# Patient Record
Sex: FEMALE | Race: OTHER | HISPANIC OR LATINO | Employment: STUDENT | URBAN - METROPOLITAN AREA
[De-identification: names, ages, dates, MRNs, and addresses within clinical notes are randomized per-mention and may not be internally consistent; named-entity substitution may affect disease eponyms.]

---

## 2020-07-12 ENCOUNTER — HOSPITAL ENCOUNTER (EMERGENCY)
Facility: HOSPITAL | Age: 25
Discharge: HOME | End: 2020-07-12
Attending: EMERGENCY MEDICINE
Payer: MEDICAID

## 2020-07-12 VITALS
HEIGHT: 62 IN | TEMPERATURE: 100 F | BODY MASS INDEX: 34.96 KG/M2 | HEART RATE: 104 BPM | DIASTOLIC BLOOD PRESSURE: 77 MMHG | OXYGEN SATURATION: 100 % | WEIGHT: 190 LBS | SYSTOLIC BLOOD PRESSURE: 148 MMHG

## 2020-07-12 DIAGNOSIS — Z20.822 SUSPECTED COVID-19 VIRUS INFECTION: Primary | ICD-10-CM

## 2020-07-12 PROCEDURE — U0003 INFECTIOUS AGENT DETECTION BY NUCLEIC ACID (DNA OR RNA); SEVERE ACUTE RESPIRATORY SYNDROME CORONAVIRUS 2 (SARS-COV-2) (CORONAVIRUS DISEASE [COVID-19]), AMPLIFIED PROBE TECHNIQUE, MAKING USE OF HIGH THROUGHPUT TECHNOLOGIES AS DESCRIBED BY CMS-2020-01-R: HCPCS

## 2020-07-12 PROCEDURE — 99284 EMERGENCY DEPT VISIT MOD MDM: CPT

## 2020-07-12 RX ORDER — GUAIFENESIN 100 MG/5ML
100-200 SOLUTION ORAL EVERY 4 HOURS PRN
Qty: 60 ML | Refills: 0 | Status: SHIPPED | OUTPATIENT
Start: 2020-07-12 | End: 2020-07-22

## 2020-07-12 RX ORDER — ALBUTEROL SULFATE 90 UG/1
1-2 AEROSOL, METERED RESPIRATORY (INHALATION) EVERY 6 HOURS PRN
Qty: 6.7 G | Refills: 0 | OUTPATIENT
Start: 2020-07-12 | End: 2020-07-16

## 2020-07-12 RX ORDER — ACETAMINOPHEN 500 MG/1
1000 CAPSULE, LIQUID FILLED ORAL EVERY 8 HOURS PRN
Qty: 30 CAPSULE | Refills: 0 | Status: SHIPPED | OUTPATIENT
Start: 2020-07-12 | End: 2022-03-28 | Stop reason: CLARIF

## 2020-07-12 RX ORDER — BENZONATATE 100 MG/1
100 CAPSULE ORAL 3 TIMES DAILY PRN
Qty: 30 CAPSULE | Refills: 0 | Status: SHIPPED | OUTPATIENT
Start: 2020-07-12 | End: 2020-07-22

## 2020-07-12 NOTE — DISCHARGE INSTRUCTIONS
Thank you for allowing me to care for you today.  I hope our treatment plan will make you feel better in the next few days.  In order for me to take better care of my future patients and improve our Emergency Department, I would appreciate if you can provide us with feedback.  In the next few days, you may receive a survey in the mail.  If you do, it would mean a great deal to me if you would please take the time to complete it.    Thank you and I hope you feel better.  Lexy Wilson NP

## 2020-07-12 NOTE — ED TRIAGE NOTES
Pt presents to ED for covid test. Pt states she was exposed to covid and has now lost her sense of taste and complaining of body aches

## 2020-07-12 NOTE — Clinical Note
"Zina "Rehan Bennett was seen and treated in our emergency department on 7/12/2020.     COVID-19 is present in our communities across the state. There is limited testing for COVID at this time, so not all patients can be tested. In this situation, your employee meets the following criteria:    Zina Bennett has met the criteria for COVID-19 testing based upon symptoms, travel, and/or potential exposure. The test has been completed and is pending results at this time. During this time the employee is not able to work and should be quarantined per the Centers for Disease Control timelines.     If you have any questions or concerns, or if I can be of further assistance, please do not hesitate to contact me.    Sincerely,             Lexy Wilson NP"

## 2020-07-13 LAB — SARS-COV-2 RNA RESP QL NAA+PROBE: NOT DETECTED

## 2020-07-13 NOTE — ED PROVIDER NOTES
Encounter Date: 7/12/2020       History     Chief Complaint   Patient presents with    COVID-19 Concerns     pt presents to ED today c/o body aches and loss of taste onset today. pt reports her boyfriend was seen here yesterday and was tested for COVID-19, however did not receive his test results      The patient is a 24-year-old female with a past medical history of kidney stones who presents to the ED complaining of body aches, cough, and loss of taste that began yesterday.  Her boyfriend has also been complaining of similar symptoms.  No known fevers.  Tolerating oral intake.  No change in elimination patterns.  No treatment attempted prior to arrival.    The history is provided by the patient.     Review of patient's allergies indicates:  No Known Allergies  Past Medical History:   Diagnosis Date    Kidney calculus      Past Surgical History:   Procedure Laterality Date    APPENDECTOMY       No family history on file.  Social History     Tobacco Use    Smoking status: Never Smoker   Substance Use Topics    Alcohol use: No    Drug use: No     Review of Systems   Constitutional: Negative for fever.   HENT: Negative for sore throat.         Loss of taste   Respiratory: Positive for cough. Negative for shortness of breath.    Cardiovascular: Negative for chest pain.   Gastrointestinal: Negative for nausea.   Genitourinary: Negative for dysuria.   Musculoskeletal: Positive for myalgias (Generalized). Negative for back pain.   Skin: Negative for rash.   Neurological: Negative for dizziness and weakness.   Hematological: Does not bruise/bleed easily.       Physical Exam     Initial Vitals   BP Pulse Resp Temp SpO2   07/12/20 1425 07/12/20 1425 -- 07/12/20 1426 07/12/20 1425   (!) 148/77 104  99.5 °F (37.5 °C) 100 %      MAP       --                Physical Exam    Nursing note and vitals reviewed.  Constitutional: She appears well-developed and well-nourished.  Non-toxic appearance. No distress.   Limited  physical exam due to use of telemedicine technology in light of COVID-19 pandemic  The patient is alert, oriented, and nontoxic appearing.  No apparent distress.  Respirations are even and unlabored.  Speaking in complete sentences.   HENT:   Head: Normocephalic and atraumatic.   Right Ear: Hearing normal.   Left Ear: Hearing and abnormal external ear normal.   Nose: Nose abnormal.   Mouth/Throat: Mucous membranes are normal.   Eyes: Conjunctivae and EOM are normal.   Neck: Full passive range of motion without pain. Neck supple.   Cardiovascular: Normal rate.   Pulmonary/Chest: Effort normal. No respiratory distress.   Musculoskeletal: Normal range of motion.   Neurological: She is alert and oriented to person, place, and time. She has normal strength. Gait normal. GCS eye subscore is 4. GCS verbal subscore is 5. GCS motor subscore is 6.   Skin: Skin is warm, dry and intact. Capillary refill takes less than 2 seconds. No rash noted.   Psychiatric: She has a normal mood and affect. Her speech is normal and behavior is normal. Judgment and thought content normal. Cognition and memory are normal.         ED Course   Procedures  Labs Reviewed   SARS-COV-2 (COVID-19) QUALITATIVE PCR          Imaging Results    None          Medical Decision Making:   History:   Old Medical Records: I decided to obtain old medical records.  Clinical Tests:   Lab Tests: Ordered  ED Management:  This is an emergent evaluation of a patient who presents to the emergency department for further evaluation of potential COVID symptoms.  Patient requests COVID test.  Limited physical exam conducted via telemedicine technology is not significantly concerning.  Patient's vital signs do not suggest sepsis.  No hypoxia or evidence of impending respiratory failure.  Patient's respirations are even and unlabored.  Patient reports tolerating oral intake.  Low suspicion for significant dehydration.  I believe that they are stable for discharge with  outpatient follow-up as warranted.      COVID test is pending.      Patient will be discharged with medications for symptomatic relief.  Patient was instructed on strategies for infectious disease prevention.  All questions answered.  Strict return precautions have been discussed.                                     Clinical Impression:       ICD-10-CM ICD-9-CM   1. Suspected Covid-19 Virus Infection  R68.89              ED Disposition Condition    Discharge Stable        ED Prescriptions     Medication Sig Dispense Start Date End Date Auth. Provider    albuterol (PROVENTIL/VENTOLIN HFA) 90 mcg/actuation inhaler Inhale 1-2 puffs into the lungs every 6 (six) hours as needed for Wheezing or Shortness of Breath. Rescue 6.7 g 7/12/2020 7/12/2021 Lexy Wilson NP    guaifenesin 100 mg/5 ml (ROBITUSSIN) 100 mg/5 mL syrup Take 5-10 mLs (100-200 mg total) by mouth every 4 (four) hours as needed for Cough. 60 mL 7/12/2020 7/22/2020 Lexy Wilson NP    benzonatate (TESSALON) 100 MG capsule Take 1 capsule (100 mg total) by mouth 3 (three) times daily as needed. 30 capsule 7/12/2020 7/22/2020 Lexy Wilson NP    acetaminophen (TYLENOL) 500 mg Cap Take 2 capsules (1,000 mg total) by mouth every 8 (eight) hours as needed (pain/fever). 30 capsule 7/12/2020  Lexy Wilson NP        Follow-up Information     Follow up With Specialties Details Why Contact Info Additional Information    [facility] Schedule an appointment as soon as possible for a visit in 3 days  [address], [address]  [facility] [address] [phone] At this time [facility] will only use these entries [facility], [facility], and Emergency Department due to COVID-19 precautions.     [facility]  If symptoms worsen [address] [address] [phone]                                      Lexy Wilson NP  07/13/20  0031

## 2020-07-16 ENCOUNTER — NURSE TRIAGE (OUTPATIENT)
Dept: ADMINISTRATIVE | Facility: CLINIC | Age: 25
End: 2020-07-16

## 2020-07-16 ENCOUNTER — HOSPITAL ENCOUNTER (EMERGENCY)
Facility: HOSPITAL | Age: 25
Discharge: HOME | End: 2020-07-16
Attending: EMERGENCY MEDICINE
Payer: MEDICAID

## 2020-07-16 VITALS
TEMPERATURE: 99 F | HEART RATE: 87 BPM | RESPIRATION RATE: 18 BRPM | DIASTOLIC BLOOD PRESSURE: 70 MMHG | SYSTOLIC BLOOD PRESSURE: 118 MMHG | BODY MASS INDEX: 40.24 KG/M2 | OXYGEN SATURATION: 98 % | WEIGHT: 220 LBS

## 2020-07-16 DIAGNOSIS — M79.10 MYALGIA: ICD-10-CM

## 2020-07-16 DIAGNOSIS — Z20.822 CLOSE EXPOSURE TO COVID-19 VIRUS: Primary | ICD-10-CM

## 2020-07-16 DIAGNOSIS — R06.02 SHORTNESS OF BREATH: ICD-10-CM

## 2020-07-16 LAB
B-HCG UR QL: NEGATIVE
CTP QC/QA: YES

## 2020-07-16 PROCEDURE — U0003 INFECTIOUS AGENT DETECTION BY NUCLEIC ACID (DNA OR RNA); SEVERE ACUTE RESPIRATORY SYNDROME CORONAVIRUS 2 (SARS-COV-2) (CORONAVIRUS DISEASE [COVID-19]), AMPLIFIED PROBE TECHNIQUE, MAKING USE OF HIGH THROUGHPUT TECHNOLOGIES AS DESCRIBED BY CMS-2020-01-R: HCPCS

## 2020-07-16 PROCEDURE — 99284 EMERGENCY DEPT VISIT MOD MDM: CPT | Mod: 25

## 2020-07-16 PROCEDURE — 81025 URINE PREGNANCY TEST: CPT | Performed by: PHYSICIAN ASSISTANT

## 2020-07-16 PROCEDURE — 93010 EKG 12-LEAD: ICD-10-PCS | Mod: ,,, | Performed by: INTERNAL MEDICINE

## 2020-07-16 PROCEDURE — 25000003 PHARM REV CODE 250: Performed by: PHYSICIAN ASSISTANT

## 2020-07-16 PROCEDURE — 93005 ELECTROCARDIOGRAM TRACING: CPT

## 2020-07-16 PROCEDURE — 93010 ELECTROCARDIOGRAM REPORT: CPT | Mod: ,,, | Performed by: INTERNAL MEDICINE

## 2020-07-16 RX ORDER — ALBUTEROL SULFATE 90 UG/1
1-2 AEROSOL, METERED RESPIRATORY (INHALATION) EVERY 6 HOURS PRN
Qty: 8 G | Refills: 0 | Status: SHIPPED | OUTPATIENT
Start: 2020-07-16 | End: 2021-07-16

## 2020-07-16 RX ORDER — IBUPROFEN 600 MG/1
600 TABLET ORAL
Status: COMPLETED | OUTPATIENT
Start: 2020-07-16 | End: 2020-07-16

## 2020-07-16 RX ADMIN — IBUPROFEN 600 MG: 200 TABLET, FILM COATED ORAL at 03:07

## 2020-07-16 NOTE — EKG INTERPRETATIONS - EMERGENCY DEPT.
Time of study: 07/16/2020 14:21    Independent interpretation by ED physician.    Normal sinus rhythm. Ventricular rate 73 bpm.  Normal axis.  Normal QRS and QT intervals.  No ST segment elevation or depression.  Normal T-wave morphology. Overall impression:  Normal EKG.

## 2020-07-16 NOTE — ED PROVIDER NOTES
Encounter Date: 7/16/2020       History     Chief Complaint   Patient presents with    Shortness of Breath     Pt c/o SOB and body aches since Sunday. States  had positive COVID-19 test recently     Zina Bennett, a 24 y.o. female  has a past medical history of Kidney calculus.     She presents to the ED evaluation of SOB and body aches since Sunday.  Patient states that her  recently tested positive for coronavirus.  She was tested on Monday but her test came back negative.  States that she has taken Tylenol and ibuprofen but that has not helped with her symptoms.  Last dose taken was yesterday.  She denies any nausea, vomiting, chest pain.      The history is provided by the patient.     Review of patient's allergies indicates:  No Known Allergies  Past Medical History:   Diagnosis Date    Kidney calculus      Past Surgical History:   Procedure Laterality Date    APPENDECTOMY       No family history on file.  Social History     Tobacco Use    Smoking status: Never Smoker   Substance Use Topics    Alcohol use: No    Drug use: No     Review of Systems   Constitutional: Positive for fatigue and fever (subjective).   HENT: Negative for congestion and sore throat.         Normal smell and taste   Respiratory: Positive for shortness of breath. Negative for cough.    Cardiovascular: Negative for chest pain.   Gastrointestinal: Negative for nausea and vomiting.   Musculoskeletal: Positive for myalgias.   Skin: Negative for rash.   Allergic/Immunologic: Negative for immunocompromised state.   Neurological: Negative for weakness.   Psychiatric/Behavioral: Negative for agitation.       Physical Exam     Initial Vitals [07/16/20 1338]   BP Pulse Resp Temp SpO2   110/68 88 17 98.9 °F (37.2 °C) 100 %      MAP       --         Physical Exam    Nursing note and vitals reviewed.  Constitutional: She appears well-developed and well-nourished. She is not diaphoretic. No distress.   HENT:   Head:  Normocephalic and atraumatic.   Right Ear: External ear normal.   Left Ear: External ear normal.   Nose: Nose normal.   Eyes: Conjunctivae and EOM are normal.   Neck: Normal range of motion.   Cardiovascular: Normal rate and regular rhythm.   Pulmonary/Chest: No respiratory distress.   Normal WOB, speaking in full sentences with NAD   Musculoskeletal: Normal range of motion.   Neurological: She is alert and oriented to person, place, and time.   Skin: No rash noted.   Psychiatric: She has a normal mood and affect. Thought content normal.         ED Course   Procedures  Labs Reviewed   SARS-COV-2 (COVID-19) QUALITATIVE PCR   POCT URINE PREGNANCY          Imaging Results          X-Ray Chest AP Portable (Final result)  Result time 07/16/20 15:43:13    Final result by Graeme Appiah MD (07/16/20 15:43:13)                 Impression:      As above.      Electronically signed by: Graeme Appiah MD  Date:    07/16/2020  Time:    15:43             Narrative:    EXAMINATION:  XR CHEST AP PORTABLE    CLINICAL HISTORY:  Shortness of breath    TECHNIQUE:  Single frontal view of the chest was performed.    COMPARISON:  None    FINDINGS:  No consolidation, pleural effusion or pneumothorax.  Cardiomediastinal silhouette is unremarkable.                                 Medical Decision Making:   Initial Assessment:   SOB, myalgias   ED Management:  Virtual Onsite Care Note:  This emergency department encounter was performed via [employer], a HIPAA-compliant virtual exam application, in concert with a tele-presenter in the room. A face to face evaluation was available to the patient in the case it was deemed necessary by me or the Emergency Department staff.     COVID pending. Normal CXR.  Vital signs do not indicate sepsis, hypoxia nor respiratory distress, and in my professional opinion the patient is well enough for discharge home. The patient was provided with discharge instructions on self-care and how to quarantine at home.  I reinforced this advice and the dangers to family and public with failure to comply. We will proceed with symptomatic treatment. The patient was also given a return to work note, if applicable. Return precautions discussed with the patient. The patient expressed understanding to my instructions.                      ED Course as of Jul 16 2130   Thu Jul 16, 2020   1610 X-Ray Chest AP Portable [AM]      ED Course User Index  [AM] Keren Gamboa PA-C                Clinical Impression:       ICD-10-CM ICD-9-CM   1. Close Exposure to Covid-19 Virus  Z20.828    2. Shortness of breath  R06.02 786.05   3. Myalgia  M79.10 729.1                                Keren Gamboa PA-C  07/16/20 2130

## 2020-07-16 NOTE — TELEPHONE ENCOUNTER
Spoke with patient she states that her  tested positive for covid-19 on Monday. Reports that her test results came back negative today.   She states that she is struggling to take a breath at this time.  Advised patient to call 911 to seek immediate medical attention.  Patient verbalized understanding.  States she will call 911 then her family.     Reason for Disposition   SEVERE difficulty breathing (e.g., struggling for each breath, speaks in single words)    Protocols used: CORONAVIRUS (COVID-19) DIAGNOSED OR [id number]

## 2020-07-16 NOTE — ED NOTES
APPEARANCE: Alert, oriented and in no acute distress.  CARDIAC: Normal rate and rhythm, no murmur heard.   PERIPHERAL VASCULAR: peripheral pulses present. Normal cap refill. No edema. Warm to touch.    RESPIRATORY:Normal rate and effort, breath sounds clear bilaterally throughout chest. Respirations are equal and unlabored no obvious signs of distress.  GASTRO: soft, bowel sounds normal, no tenderness, no abdominal distention.  MUSC: Limited ROM due to weakness. No bony tenderness or soft tissue tenderness. No obvious deformity.  SKIN: Skin is warm and dry, normal skin turgor, mucous membranes moist.  NEURO: 5/5 strength major flexors/extensors bilaterally. Sensory intact to light touch bilaterally. Yuan coma scale: eyes open spontaneously-4, oriented & converses-5, obeys commands-6. No neurological abnormalities.   MENTAL STATUS: awake, alert and aware of environment.  EYE: PERRL, both eyes: pupils brisk and reactive to light. Normal size.  ENT: EARS: no obvious drainage. NOSE: no active bleeding.   Pt complains of body aches and headache with cough

## 2020-07-16 NOTE — Clinical Note
"Zina "Rehan Bennett was seen and treated in our emergency department on 7/16/2020.     COVID-19 is present in our communities across the state. There is limited testing for COVID at this time, so not all patients can be tested. In this situation, your employee meets the following criteria:    Zina Bennett has met the criteria for COVID-19 testing based upon symptoms, travel, and/or potential exposure. The test has been completed and is pending results at this time. During this time the employee is not able to work and should be quarantined per the Centers for Disease Control timelines.     If you have any questions or concerns, or if I can be of further assistance, please do not hesitate to contact me.    Sincerely,             Keren Gamboa PA-C"

## 2020-07-17 LAB — SARS-COV-2 RNA RESP QL NAA+PROBE: DETECTED

## 2020-07-20 ENCOUNTER — HOSPITAL ENCOUNTER (EMERGENCY)
Facility: HOSPITAL | Age: 25
Discharge: HOME | End: 2020-07-20
Attending: EMERGENCY MEDICINE
Payer: MEDICAID

## 2020-07-20 ENCOUNTER — TELEPHONE (OUTPATIENT)
Dept: PODIATRY | Facility: CLINIC | Age: 25
End: 2020-07-20

## 2020-07-20 ENCOUNTER — NURSE TRIAGE (OUTPATIENT)
Dept: ADMINISTRATIVE | Facility: CLINIC | Age: 25
End: 2020-07-20

## 2020-07-20 VITALS
TEMPERATURE: 99 F | BODY MASS INDEX: 38.98 KG/M2 | RESPIRATION RATE: 18 BRPM | SYSTOLIC BLOOD PRESSURE: 117 MMHG | HEART RATE: 82 BPM | DIASTOLIC BLOOD PRESSURE: 74 MMHG | OXYGEN SATURATION: 97 % | HEIGHT: 63 IN | WEIGHT: 220 LBS

## 2020-07-20 DIAGNOSIS — M79.674 PAIN OF TOE OF RIGHT FOOT: Primary | ICD-10-CM

## 2020-07-20 PROCEDURE — 99282 EMERGENCY DEPT VISIT SF MDM: CPT

## 2020-07-20 RX ORDER — NAPROXEN 500 MG/1
500 TABLET ORAL 2 TIMES DAILY WITH MEALS
Qty: 14 TABLET | Refills: 0 | Status: SHIPPED | OUTPATIENT
Start: 2020-07-20 | End: 2020-07-20 | Stop reason: SDUPTHER

## 2020-07-20 RX ORDER — NAPROXEN 500 MG/1
500 TABLET ORAL 2 TIMES DAILY WITH MEALS
Qty: 14 TABLET | Refills: 0 | Status: SHIPPED | OUTPATIENT
Start: 2020-07-20

## 2020-07-20 NOTE — ED PROVIDER NOTES
Encounter Date: 7/20/2020       History     Chief Complaint   Patient presents with    Toe Pain     C/o pain and swelling to right 1st toe x2 days. Denies injury. Tested positive for covid-19 on Wednesday     Zina Bennett, a 24 y.o. female  has a past medical history of Kidney calculus.     She presents to the ED evaluation of pain and swelling to right great toe that started 2 days ago.  States that she tested positive for COVID on Wednesday.  Nursing line contacted her to discuss symptoms and instructed her to come to the ED.  Pain is worse on medial side of toe.  Denies any injury or drainage from site.  Treatments tried include administration of tylenol and ibuprofen with no improvement.  No history of gout.       The history is provided by the patient.     Review of patient's allergies indicates:   Allergen Reactions    Penicillins      Past Medical History:   Diagnosis Date    Kidney calculus      Past Surgical History:   Procedure Laterality Date    APPENDECTOMY       No family history on file.  Social History     Tobacco Use    Smoking status: Never Smoker   Substance Use Topics    Alcohol use: No    Drug use: No     Review of Systems   Constitutional: Negative for fever.   Gastrointestinal: Negative for nausea and vomiting.   Musculoskeletal: Positive for arthralgias.   Skin: Negative for color change and rash.   Allergic/Immunologic: Negative for immunocompromised state.   Neurological: Negative for weakness and numbness.   Psychiatric/Behavioral: Negative for agitation.       Physical Exam     Initial Vitals [07/20/20 0738]   BP Pulse Resp Temp SpO2   117/74 82 18 99 °F (37.2 °C) 97 %      MAP       --         Physical Exam    Nursing note and vitals reviewed.  Constitutional: She appears well-developed and well-nourished.   HENT:   Head: Normocephalic and atraumatic.   Right Ear: External ear normal.   Left Ear: External ear normal.   Nose: Nose normal.   Eyes: Conjunctivae and EOM are  normal.   Neck: Normal range of motion.   Cardiovascular: Normal rate and regular rhythm.   Pulmonary/Chest: No respiratory distress.   Musculoskeletal: Normal range of motion.        Feet:       Comments: TTP to medial aspect of right great toe.  No induration or skin changes.  No drainage from site.  Gel Toenail Polish is in place therefore cannot fully examine nailbed   Neurological: She is alert and oriented to person, place, and time.   Skin: Skin is warm and dry. Capillary refill takes less than 2 seconds. No rash noted. No erythema.   Psychiatric: She has a normal mood and affect. Thought content normal.         ED Course   Procedures  Labs Reviewed - No data to display       Imaging Results    None          Medical Decision Making:   Initial Assessment:   Right great toe pain   Differential Diagnosis:   Fracture, gout, ingrown toe nail, covid toes   ED Management:  Pt presents to ED for evaluation of atraumatic right great toe pain.  Skin is clean dry and intact.  No evidence abscess development no rash noted.  Tenderness along medial nailbed, concerning for possible developing ingrown toenail.  Patient was given round of anti-inflammatories.  Given information on symptomatic control and reasons for return.  Patient verbalized understanding agreement with plan.                                 Clinical Impression:       ICD-10-CM ICD-9-CM   1. Pain of toe of right foot  M79.674 729.5             ED Disposition Condition    Discharge Stable        ED Prescriptions     Medication Sig Dispense Start Date End Date Auth. Provider    naproxen (NAPROSYN) 500 MG tablet  (Status: Discontinued) Take 1 tablet (500 mg total) by mouth 2 (two) times daily with meals. 14 tablet 7/20/2020 7/20/2020 Keren Gamboa PA-C    naproxen (NAPROSYN) 500 MG tablet Take 1 tablet (500 mg total) by mouth 2 (two) times daily with meals. 14 tablet 7/20/2020  Keren Gamboa PA-C        Follow-up Information     Follow up With  Specialties Details Why Contact Info    Kash Briseno, DPSB Podiatry, Wound Care In 3 days  [address]  [address]  [facility] [address] [phone]

## 2020-07-20 NOTE — TELEPHONE ENCOUNTER
----- Message from Emily Perla sent at 7/20/2020  9:00 AM CDT -----  Contact: [phone]  Pt is requesting a callback from the office in regards to scheduling a Hospital follow-up visit with the doctor.    Please call and advise

## 2020-07-20 NOTE — TELEPHONE ENCOUNTER
Reason for Disposition   Foot is cool or blue in comparison to other foot    Protocols used: ST TOE PAIN-A-AH    Severe pain in great toe, no injury. She has covid 19. The right foot feels colder than the left and it is very difficult to walk on the right foot. Explained that covivd 19 has been causing blood clots in some patients and she needs to get her foot evaluated in the ED. Advised her to get a ride but she insists on driving herself.

## 2020-07-20 NOTE — ED NOTES
C/o pain and swelling to right 1st toe x2 days. Denies injury. Tested positive for covid-19 on Wednesday.    APPEARANCE: Alert, oriented and in no acute distress.  HEENT: Speaks without hoarseness.  CARDIAC: Normal rate and rhythm.    PERIPHERAL VASCULAR: peripheral pulses present. Normal cap refill. No edema. Warm to touch.    RESPIRATORY:Normal rate and effort. Respirations are equal and unlabored no obvious signs of distress.  GASTRO: soft, nondistended, nontender. Denies nausea, vomiting, or diarrhea.  : voids spontaneously and without difficulty.   MUSC: Full ROM. No obvious deformity. Ambulatory with a steady gait  SKIN: Skin is warm and dry, without discoloration. Mucous membranes moist. Mild redness next to right 1st toenail  NEURO: Pt is awake, alert, aware of environment. No neurologic deficits noted.

## 2020-07-22 ENCOUNTER — NURSE TRIAGE (OUTPATIENT)
Dept: ADMINISTRATIVE | Facility: CLINIC | Age: 25
End: 2020-07-22

## 2020-07-22 PROCEDURE — 99284 EMERGENCY DEPT VISIT MOD MDM: CPT

## 2020-07-22 RX ORDER — CLOTRIMAZOLE 1 %
CREAM (GRAM) TOPICAL
Qty: 15 G | Refills: 0 | Status: SHIPPED | OUTPATIENT
Start: 2020-07-22 | End: 2020-07-23 | Stop reason: SDUPTHER

## 2020-07-23 ENCOUNTER — HOSPITAL ENCOUNTER (EMERGENCY)
Facility: HOSPITAL | Age: 25
Discharge: HOME | End: 2020-07-23
Attending: EMERGENCY MEDICINE
Payer: MEDICAID

## 2020-07-23 VITALS
BODY MASS INDEX: 38.98 KG/M2 | WEIGHT: 220 LBS | TEMPERATURE: 98 F | DIASTOLIC BLOOD PRESSURE: 68 MMHG | OXYGEN SATURATION: 100 % | SYSTOLIC BLOOD PRESSURE: 132 MMHG | RESPIRATION RATE: 18 BRPM | HEART RATE: 67 BPM | HEIGHT: 63 IN

## 2020-07-23 DIAGNOSIS — B35.3 TINEA PEDIS OF BOTH FEET: ICD-10-CM

## 2020-07-23 DIAGNOSIS — M79.674 GREAT TOE PAIN, RIGHT: Primary | ICD-10-CM

## 2020-07-23 DIAGNOSIS — M79.604 RIGHT LEG PAIN: ICD-10-CM

## 2020-07-23 RX ORDER — CLOTRIMAZOLE 1 %
CREAM (GRAM) TOPICAL
Qty: 15 G | Refills: 0 | Status: SHIPPED | OUTPATIENT
Start: 2020-07-23 | End: 2022-03-28 | Stop reason: CLARIF

## 2020-07-23 NOTE — ED NOTES
Pt. Given post op shoe. Tolerating well. Pt. C/o pain to the right side of calf. Dr. Steiner aware. New order received for U/[facility].

## 2020-07-23 NOTE — TELEPHONE ENCOUNTER
Reason for Disposition   Purple or black skin on foot or toe    Additional Information   Negative: Followed a foot injury   Negative: Diabetes   Negative: Ankle pain is main symptom   Negative: Thigh or calf pain is main symptom   Negative: Entire foot is cool or blue in comparison to other foot    Protocols used: FOOT PAIN-A-AH

## 2020-07-23 NOTE — TELEPHONE ENCOUNTER
"Pt c/o unrelieved/ worsening pain to R foot, +yellow discharge & reports toe "turning now purple".   "

## 2020-07-23 NOTE — DISCHARGE INSTRUCTIONS
Elevate, ice, rest, post op shoe as needed, tylenol/naproxen as needed, follow up with primary care doctor or podiatrist

## 2020-07-23 NOTE — ED NOTES
"Review of patient's allergies indicates:   Allergen Reactions    Penicillins         Patient has verified the spelling of their name and  on armband.   APPEARANCE: Patient is alert, calm, oriented x 4, and does not appear distressed.  SKIN: Skin is normal for race, warm, and dry. Normal skin turgor and mucous membranes moist. Pt great big toe on the right side has some redness noted. Pt denies any trauma. Pt reports it was oozing "white stuff" earlier today.   CARDIAC: Normal rate and rhythm, no murmur heard.   RESPIRATORY:Normal rate and effort. Breath sounds clear bilaterally throughout chest. Respirations are equal and unlabored.    MUSCLE: Full ROM. No bony tenderness or soft tissue tenderness. No obvious deformity.  PERIPHERAL VASCULAR: peripheral pulses present. Normal cap refill. No edema. Warm to touch. Pt complains of tightness in her right calf. Pt denies any numbness but reports a tingling sensation down the right lower extremity   NEURO: 5/5 strength major flexors/extensors bilaterally. Sensory intact to light touch bilaterally. Yuan coma scale: eyes open spontaneously-4, oriented & converses-5, obeys commands-6. No neurological abnormalities.   MENTAL STATUS: awake, alert and aware of environment.    "

## 2020-07-23 NOTE — ED NOTES
Pt presented to the ed with c/o right great toe pain. Pt denies any trauma to the area. Pt reports worsening pain when ambulating. Pt reports taking naproxen and tylenlol without relief. Pt vital signs stable at this time. Pt given a call bell and instructed to call for assistance.

## 2021-04-16 ENCOUNTER — PATIENT MESSAGE (OUTPATIENT)
Dept: RESEARCH | Facility: HOSPITAL | Age: 26
End: 2021-04-16

## 2022-02-16 ENCOUNTER — PATIENT MESSAGE (OUTPATIENT)
Dept: RESEARCH | Facility: HOSPITAL | Age: 27
End: 2022-02-16
Payer: MEDICAID

## 2022-03-27 ENCOUNTER — HOSPITAL ENCOUNTER (EMERGENCY)
Facility: HOSPITAL | Age: 27
Discharge: HOME | End: 2022-03-27
Attending: EMERGENCY MEDICINE
Payer: MEDICAID

## 2022-03-27 VITALS
HEART RATE: 86 BPM | SYSTOLIC BLOOD PRESSURE: 143 MMHG | DIASTOLIC BLOOD PRESSURE: 77 MMHG | OXYGEN SATURATION: 100 % | TEMPERATURE: 98 F | RESPIRATION RATE: 16 BRPM

## 2022-03-27 DIAGNOSIS — G56.01 CARPAL TUNNEL SYNDROME OF RIGHT WRIST: Primary | ICD-10-CM

## 2022-03-27 PROCEDURE — 99282 PR EMERGENCY DEPT VISIT,LEVEL II: ICD-10-PCS | Mod: ,,, | Performed by: EMERGENCY MEDICINE

## 2022-03-27 PROCEDURE — 99282 EMERGENCY DEPT VISIT SF MDM: CPT | Mod: ,,, | Performed by: EMERGENCY MEDICINE

## 2022-03-27 PROCEDURE — 99282 EMERGENCY DEPT VISIT SF MDM: CPT

## 2022-03-27 NOTE — ED PROVIDER NOTES
Encounter Date: 3/27/2022       History     Chief Complaint   Patient presents with    Wrist Pain     Pt c/o R wrist pain x3 weeks. Pt states she fell on her R arm at work. Pt also reports she has a ganglian cyst on her R wrist. +2 radial pulse to RUE. No obvious deformities noted.      Patient is 26 year old woman presenting for right wrist pain. This pain has occurred since 3/10 after injuring her wrist at work while lifting something heavy. Has been seen twice at ER and clinic for this pain. Plain films without fracture dislocation. Has been wearing wrist brace and taking ibuprofen and tylenol for pain, but this has not been successful in alleviating her pain. Describes the pain as shock-like and sharp. No weakness. No change in color of affected extremity. No edema of the affected extremity. Reports a small cyst that developed on her right wrist after the injury.         Review of patient's allergies indicates:   Allergen Reactions    Penicillins      Past Medical History:   Diagnosis Date    Asthma     Kidney calculus      Past Surgical History:   Procedure Laterality Date    APPENDECTOMY       History reviewed. No pertinent family history.  Social History     Tobacco Use    Smoking status: Never Smoker    Smokeless tobacco: Never Used   Substance Use Topics    Alcohol use: No    Drug use: No     Review of Systems   Constitutional: Negative for fever.   HENT: Negative for sore throat.    Respiratory: Negative for shortness of breath.    Cardiovascular: Negative for chest pain.   Gastrointestinal: Negative for nausea.   Genitourinary: Negative for dysuria.   Musculoskeletal: Positive for arthralgias.   Skin: Negative for rash.   Neurological: Negative for weakness.        Shock like pain right wrist    Hematological: Does not bruise/bleed easily.       Physical Exam     Initial Vitals [03/27/22 0036]   BP Pulse Resp Temp SpO2   (!) 143/77 86 16 98.2 °F (36.8 °C) 100 %      MAP       --         Physical  Exam    Nursing note and vitals reviewed.  Constitutional: She appears well-developed and well-nourished. She is not diaphoretic. No distress.   HENT:   Head: Normocephalic and atraumatic.   Eyes: EOM are normal.   Neck: Neck supple.   Normal range of motion.  Cardiovascular: Normal rate.   Pulmonary/Chest: No respiratory distress.   Abdominal: Abdomen is soft. She exhibits no distension.   Musculoskeletal:      Cervical back: Normal range of motion and neck supple.      Comments: Right wrist:   -2+ radial pulse  -Normal sensation  -Soft compartment  -No external injury  -ROM limited by pain  -Phalen and Tinel test positive  -Small cyst lateral volar wrist     Neurological: She is alert and oriented to person, place, and time.   Skin: Skin is warm and dry.         ED Course   Procedures  Labs Reviewed - No data to display       Imaging Results    None          Medications - No data to display  Medical Decision Making:   Initial Assessment:   Injured right wrist on 3/10. No fracture or dislocation. Still pain, sharp and shooting in nature. No relief with wrist brace and ibuprofen. Normal vitals. Exam of the right hand and wrist as above - no evidence of compartment syndrome, no weakness. Phalen and Tinel positive.  ED Management:  Patient has carpal tunnel syndrome. Likely 2/2 development of small ganglion cyst on volar wrist. Provided patient new, better wrist brace with more support. Discussed with her that no emergency intervention is available to fix her wrist pain. Discharged with the following plan:  -follow up with hand surgery  -continue taking OTC pain medications  -continue wearing wrsit brace                       Clinical Impression:   Final diagnoses:  [G56.01] Carpal tunnel syndrome of right wrist (Primary)          ED Disposition Condition    Discharge Stable        ED Prescriptions     None        Follow-up Information     Follow up With Specialties Details Why Contact Info Additional Information     [facility] Schedule an appointment as soon as possible for a visit   [address], [address]  [facility] [address] [phone] [facility], 9th Floor Please park in [place] [address] and use [address] elevators    [facility] Emergency Dept Emergency Medicine Go to  As needed, If symptoms worsen [address]  [facility] [address] [phone]            Laverne Rodriguez MD  Resident  03/27/22 7622

## 2022-03-27 NOTE — ED TRIAGE NOTES
Zina Bennett, a 26 y.o. female presents to the ED w/ complaint of     Triage note:  Chief Complaint   Patient presents with    Wrist Pain     Pt c/o R wrist pain x3 weeks. Pt states she fell on her R arm at work. Pt also reports she has a ganglian cyst on her R wrist. +2 radial pulse to RUE. No obvious deformities noted.      Review of patient's allergies indicates:   Allergen Reactions    Penicillins      Past Medical History:   Diagnosis Date    Kidney calculus      Patient identifiers for Zina Bennett checked and correct.    LOC: The patient is awake, alert and aware of environment with an appropriate affect, the patient is oriented x 4 and speaking appropriately.    APPEARANCE: Patient resting comfortably and in no acute distress, patient is clean and well groomed, patient's clothing is properly fastened.    SKIN: The skin is warm and dry, color consistent with ethnicity, patient has normal skin turgor and moist mucus membranes, skin intact, no breakdown or bruising noted.    MUSCULOSKELETAL: Patient moving all extremities well, no obvious swelling or deformities noted. Right wrist pain.    RESPIRATORY: Airway is open and patent, respirations are spontaneous and even, patient has a normal effort and rate.    CARDIAC: Patient has a normal rate and rhythm, no periphreal edema noted, capillary refill < 3 seconds. Normal +2 pedal pulses present.    ABDOMEN: Soft and non tender to palpation, no distention noted.    NEUROLOGIC: Eyes open spontaneously, PERRL, behavior appropriate to situation, follows commands, facial expression symmetrical, bilateral hand grasp equal and even, purposeful motor response noted, normal sensation in all extremities.     Allergies reported:   Review of patient's allergies indicates:   Allergen Reactions    Penicillins

## 2022-03-28 ENCOUNTER — HOSPITAL ENCOUNTER (EMERGENCY)
Facility: HOSPITAL | Age: 27
Discharge: HOME | End: 2022-03-28
Attending: EMERGENCY MEDICINE
Payer: MEDICAID

## 2022-03-28 VITALS
HEART RATE: 98 BPM | OXYGEN SATURATION: 98 % | SYSTOLIC BLOOD PRESSURE: 139 MMHG | TEMPERATURE: 99 F | WEIGHT: 238.31 LBS | DIASTOLIC BLOOD PRESSURE: 71 MMHG | RESPIRATION RATE: 18 BRPM | HEIGHT: 63 IN | BODY MASS INDEX: 42.23 KG/M2

## 2022-03-28 DIAGNOSIS — M25.531 RIGHT WRIST PAIN: Primary | ICD-10-CM

## 2022-03-28 DIAGNOSIS — M67.431 GANGLION CYST OF VOLAR ASPECT OF RIGHT WRIST: ICD-10-CM

## 2022-03-28 DIAGNOSIS — G56.01 CARPAL TUNNEL SYNDROME ON RIGHT: ICD-10-CM

## 2022-03-28 PROCEDURE — 99282 EMERGENCY DEPT VISIT SF MDM: CPT | Mod: ,,, | Performed by: NURSE PRACTITIONER

## 2022-03-28 PROCEDURE — 99282 EMERGENCY DEPT VISIT SF MDM: CPT

## 2022-03-28 PROCEDURE — 99282 PR EMERGENCY DEPT VISIT,LEVEL II: ICD-10-PCS | Mod: ,,, | Performed by: NURSE PRACTITIONER

## 2022-03-28 NOTE — LETTER
"[facility] - [facility] Dept  [address]  [facility] [address]  Phone: [phone]  Fax: [phone]   March 28, 2022    Patient: Zina Bennett (Bessy)   YOB: 1995   Date of Visit: 3/28/2022   Patient ID [id number]       To Whom It May Concern:    Zina Bennett (Bessy) was seen and treated in our emergency department on 3/28/2022. She may return to work in 1 week, or when advised by orthopedics.      Sincerely,          TATI RuddC       "

## 2022-03-28 NOTE — DISCHARGE INSTRUCTIONS
If you would like to follow up with the [facility] for further care of your wrist pain, please call the [facility] Scheduling Department at [phone] during business hours. Please let the  know you need an appointment with Orthopedics, and you will be scheduled in the Orthopedic Clinic. Please bring your original Emergency Department discharge papers with you to the clinic appointment.

## 2022-03-28 NOTE — ED NOTES
Appearance:  Pt awake, alert & oriented to person, place & time.  Pt in no acute distress at present time.  Skin:  Skin warm, dry & intact.  Mucous membranes moist.  Skin turgor normal.  Respiratory:  Respirations even, non-labored.    Neurologic:  Pt moving all extremities without difficulty.  Sensation intact.     Peripheral Vascular:  All peripheral pulses present.  Musculoskeletal:  Arrives with wrist splint.  Limited ROM secondary to pain.  Generalized tenderness to right wrist/forearm. Palpable knot to right medial wrist

## 2022-03-28 NOTE — ED TRIAGE NOTES
Pt injured her right wrist at work moving a big piece of furniture on 3/10.  Initially was seen at [facility] and told to go to PT.  Pt states pain is getting worse.  Pt seen on Saturday in ED for same-states she was told to see hand specialist.

## 2022-03-29 NOTE — ED PROVIDER NOTES
Chief complaint:  Wrist Injury (Seen here on sat for wrist pain on sat and no better, injury in early march, arrives with splint )      HPI:  Zina Bennett is a 26 y.o. female presenting for evaluation of right wrist pain.     She is Eritrean speaking but is fluent in English and declines the use of a .     She reports right wrist pain that began 3/10/22 after pushing on a heavy piece of furniture while at work. She was subsequently seen at [facility] where she had initial x-rays and workup done per her employer. She was prescribed physical therapy and attended 3 sessions. She has since been seen in the ED several times for this concern, most recently 3/27/22. She has been diagnosed with carpal tunnel syndrome and a ganglion cyst.    She denies any new trauma, weakness, numbness or a cool or pale extremity. She states the pain has been worsening since the injury. She called to schedule and appt in the hand clinic but was told that they do not accept her insurance. She returns today requesting an ortho follow up recommendation. She is wearing her splint. Denies swelling, redness or fever. No other problems or concerns voiced at this time.    Review of patient's allergies indicates:   Allergen Reactions    Penicillins        No current facility-administered medications on file prior to encounter.     Current Outpatient Medications on File Prior to Encounter   Medication Sig Dispense Refill    albuterol (PROVENTIL/VENTOLIN HFA) 90 mcg/actuation inhaler Inhale 1-2 puffs into the lungs every 6 (six) hours as needed for Wheezing. Rescue 8 g 0    naproxen (NAPROSYN) 500 MG tablet Take 1 tablet (500 mg total) by mouth 2 (two) times daily with meals. 14 tablet 0       PMH:  As per HPI and below:  Past Medical History:   Diagnosis Date    Asthma     Kidney calculus      Past Surgical History:   Procedure Laterality Date    APPENDECTOMY         Social History     Socioeconomic History    Marital status:  Single   Tobacco Use    Smoking status: Never Smoker    Smokeless tobacco: Never Used   Substance and Sexual Activity    Alcohol use: No    Drug use: No    Sexual activity: Yes     Partners: Male     Birth control/protection: Other-see comments       No family history on file.    Review of Systems  As per HPI and Below  Constitutional: Negative for chills and fever.   HENT: Negative for congestion and sinus pressure.    Eyes: Negative for visual disturbance.   Respiratory: Negative for cough, chest tightness and shortness of breath.    Cardiovascular: Negative for chest pain.   Gastrointestinal: Negative for abdominal pain, diarrhea, nausea and vomiting.   Genitourinary: Negative for flank pain. Negative for dysuria.  Musculoskeletal: Positive for arthralgia.  Skin: Negative for rash and wound.   Neurological: Negative for dizziness. Negative for weakness and numbness.   Psychiatric/Behavioral: Negative for confusion.       Physical Exam:    Vitals:    03/28/22 1659   BP: 139/71   Pulse: 98   Resp: 18   Temp: 99.1 °F (37.3 °C)     Nursing note and vitals reviewed.  Constitutional: Patient appears well-developed and well-nourished. Not diaphoretic. No distress.   HENT:   Head: Normocephalic and atraumatic.   Eyes: Conjunctivae are normal. No scleral icterus.   Neck: Normal range of motion. Neck supple.   Cardiovascular: Normal rate, regular rhythm and normal heart sounds.   Pulmonary/Chest: Breath sounds normal. No respiratory distress.  Abdominal: Soft. There is no tenderness.   Musculoskeletal: Pt has TTP of the volar aspect of the right wrist. No deformity. No swelling, erythema or warmth. She has guarded ROM, normal passive ROM. Normal radial pulse.   Neurological: Alert and oriented to person, place, and time. Normal strength. No sensory deficit.   Skin: Skin is warm and dry. No rash noted. No erythema. No pallor.   Psychiatric: Normal mood and affect. Thought content normal.       Labs Reviewed - No data  to display    Imaging Results    None         No results found.    Procedures      MDM:    26 y.o. female presenting with right wrist pain that occurred while moving furniture at work on 3/10/22. Denies new injury. She is afebrile, non toxic and non distressed. No evidence of acute injury, septic arthritis, compartment syndrome, ischemic limb or other emergent condition. She has been evaluated in the ED previously but returns for ortho recommendation as the [facility] hand clinic does not take her insurance. She was provided follow up information to schedule appt at [facility]. Meanwhile, she should continue OTC NSAIDs per package instructions if need and continue to wear her brace. ED return precautions discussed.           Diagnoses:  The primary encounter diagnosis was Right wrist pain. Diagnoses of Carpal tunnel syndrome on right and Ganglion cyst of volar aspect of right wrist were also pertinent to this visit.         Fany Brian NP  03/29/22 3215

## 2023-03-20 NOTE — ED PROVIDER NOTES
Encounter Date: 7/22/2020       History     Chief Complaint   Patient presents with    Toe Pain     Pt. c/o right great toe pain that began on Sunday. Pt.s states on Monday the toe began to have drainage and now has a purplish hue to pt. Pt. denies shortness of breath, fever or body aches.      25 y/o F presenting to ED with continued right great toe pain. She was seen in ED two days ago with same complaint, but states the medicine she was prescribed is not helping her pain. She also reports the podiatrist she was referred to is too far away. She denies any injury or trauma to toe. She did have clear/yellow drainage near toenail yesterday that has resolved today. She also mentions occasional itching/burning sensation between toes. She was also tested positive for COVID one week ago, but states her symptoms have since resolved and she is otherwise feeling well. No other acute complaints at this time.       The history is provided by the patient.     Review of patient's allergies indicates:   Allergen Reactions    Penicillins      Past Medical History:   Diagnosis Date    Kidney calculus      Past Surgical History:   Procedure Laterality Date    APPENDECTOMY       No family history on file.  Social History     Tobacco Use    Smoking status: Never Smoker   Substance Use Topics    Alcohol use: No    Drug use: No     Review of Systems   Constitutional: Negative for activity change, chills and fever.   HENT: Negative for congestion and sore throat.    Eyes: Negative for visual disturbance.   Respiratory: Negative for cough and shortness of breath.    Cardiovascular: Negative for chest pain.   Gastrointestinal: Negative for abdominal pain, nausea and vomiting.   Genitourinary: Negative for difficulty urinating.   Musculoskeletal: Positive for gait problem. Negative for back pain, joint swelling and neck stiffness.        Right great toe pain   Neurological: Negative for weakness, light-headedness and headaches.    Hematological: Does not bruise/bleed easily.       Physical Exam     Initial Vitals [07/22/20 2130]   BP Pulse Resp Temp SpO2   116/64 96 18 98.5 °F (36.9 °C) 97 %      MAP       --         Physical Exam    Vitals reviewed.  Constitutional: She appears well-developed and well-nourished. She is cooperative.   HENT:   Head: Normocephalic and atraumatic.   Eyes: EOM are normal.   Neck: Normal range of motion. Neck supple.   Cardiovascular: Normal rate, regular rhythm and normal heart sounds.   Pulmonary/Chest: Effort normal and breath sounds normal.   Abdominal: Soft. Normal appearance.   Musculoskeletal: Tenderness present.        Feet:       Comments: Peeling of skin noted between all toes   Neurological: She is alert and oriented to person, place, and time. She is not disoriented. GCS eye subscore is 4. GCS verbal subscore is 5. GCS motor subscore is 6.   Skin: Skin is warm and dry. Capillary refill takes less than 2 seconds. No abscess noted. No erythema.   Psychiatric: She has a normal mood and affect. Her behavior is normal. Thought content normal.         ED Course   Procedures  Labs Reviewed - No data to display       Imaging Results    None          Medical Decision Making:   Differential Diagnosis:   Ingrown toenail, gout, abscess, fracture, tinea pedis, skin irritant  ED Management:  25 y/o F presenting with tenderness along medial aspect of great toenail. No evidence of infection. No evidence of abscess formation. There is minimal surrounding erythema and swelling with no active drainage. It appears to be early onset of ingrown toenail. She also appears to have bilateral tinea pedis. She will be placed into postop shoe today, and will be prescribe Lotramine cream. Encouraged to follow up with podiatrist/PCP for toenail management, Tylenol/naprosen as needed, elevate, ice. She was educated on sign/sx of infection. She understands instructions and agrees with plan.                                  Clinical  Impression:       ICD-10-CM ICD-9-CM   1. Great toe pain, right  M79.674 729.5   2. Tinea pedis of both feet  B35.3 110.4                              Graft Donor Site Dermal Sutures (Optional): 4-0 Vicryl

## 2025-07-16 ENCOUNTER — HOSPITAL ENCOUNTER (EMERGENCY)
Facility: HOSPITAL | Age: 30
Discharge: HOME | End: 2025-07-16
Attending: EMERGENCY MEDICINE
Payer: COMMERCIAL

## 2025-07-16 VITALS
HEART RATE: 79 BPM | WEIGHT: 244 LBS | TEMPERATURE: 98 F | SYSTOLIC BLOOD PRESSURE: 117 MMHG | BODY MASS INDEX: 43.23 KG/M2 | OXYGEN SATURATION: 97 % | DIASTOLIC BLOOD PRESSURE: 68 MMHG | RESPIRATION RATE: 16 BRPM | HEIGHT: 63 IN

## 2025-07-16 DIAGNOSIS — S60.221A CONTUSION OF RIGHT HAND, INITIAL ENCOUNTER: ICD-10-CM

## 2025-07-16 DIAGNOSIS — S49.91XA ARM INJURY, RIGHT, INITIAL ENCOUNTER: ICD-10-CM

## 2025-07-16 DIAGNOSIS — S67.21XA CRUSHING INJURY OF RIGHT HAND: Primary | ICD-10-CM

## 2025-07-16 LAB
B-HCG UR QL: NEGATIVE
CTP QC/QA: YES

## 2025-07-16 PROCEDURE — 81025 URINE PREGNANCY TEST: CPT | Performed by: EMERGENCY MEDICINE

## 2025-07-16 PROCEDURE — 99283 EMERGENCY DEPT VISIT LOW MDM: CPT | Mod: 25

## 2025-07-16 PROCEDURE — 25000003 PHARM REV CODE 250: Performed by: EMERGENCY MEDICINE

## 2025-07-16 RX ORDER — HYDROCODONE BITARTRATE AND ACETAMINOPHEN 5; 325 MG/1; MG/1
1 TABLET ORAL EVERY 12 HOURS PRN
Qty: 6 TABLET | Refills: 0 | Status: SHIPPED | OUTPATIENT
Start: 2025-07-16 | End: 2025-07-16 | Stop reason: ALTCHOICE

## 2025-07-16 RX ORDER — KETOROLAC TROMETHAMINE 10 MG/1
10 TABLET, FILM COATED ORAL
Status: COMPLETED | OUTPATIENT
Start: 2025-07-16 | End: 2025-07-16

## 2025-07-16 RX ORDER — IBUPROFEN 600 MG/1
600 TABLET, FILM COATED ORAL EVERY 6 HOURS PRN
Qty: 20 TABLET | Refills: 0 | Status: SHIPPED | OUTPATIENT
Start: 2025-07-16

## 2025-07-16 RX ADMIN — KETOROLAC TROMETHAMINE 10 MG: 10 TABLET, FILM COATED ORAL at 09:07

## 2025-07-16 NOTE — Clinical Note
"Zina Ellisfabio Bennett was seen and treated in our emergency department on 7/16/2025.  She may return with limitations on 07/19/2025.  Light duty, no heavy lifting with the right hand for 3 weeks     Sincerely,      Cali Earl, DO    "

## 2025-07-16 NOTE — Clinical Note
"Zina "Zinafabio Bennett was seen and treated in our emergency department on 7/16/2025.  She may return with limitations on 07/19/2025.  No heavy lifting for 1 week of the right hand.     Sincerely,      Cali Earl, DO    "

## 2025-07-17 NOTE — ED TRIAGE NOTES
Patient identifiers verified and correct for Zina Bennett  C/C: right hand pain/swelling  APPEARANCE: awake and alert  SKIN: warm, dry and intact. No breakdown or bruising.  MUSCULOSKELETAL: Patient moving all extremities spontaneously, no obvious swelling or deformities noted. Ambulates independently. Some swelling noted to right knuckles  RESPIRATORY: Denies shortness of breath.Respirations unlabored.   CARDIAC: Denies CP, 2+ distal pulses; no peripheral edema  ABDOMEN: S/ND/NT, Denies nausea  : voids spontaneously, denies difficulty  Neurologic: AAO x 4; follows commands equal strength in all extremities; denies numbness/tingling. Denies dizziness       Stacking food trays and, unsure of what exactly happened, but it slid out and and she tried to catch it. However it hit her right hand, the weight of it had a hard impact, caused swelling almost immediately. Reports pain up through elbow.  Hx of surgery on the right hand in 2022

## 2025-07-17 NOTE — ED PROVIDER NOTES
Encounter Date: 7/16/2025       History     Chief Complaint   Patient presents with    Hand Injury     States crushed right hand at work and it hurts to move and is swollen. States injury occured at  approx 1700     HPI    Zina Bennett is a 29-year-old female with a history of asthma, depression, kidney stones and migraines presenting with right hand crush injury.  Patient states that she crushed her right hand at work, it hurts to move and is swollen.  She reports her injury occurred approximately 5:00 p.m..  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  She reports immediate pain and swelling extending from her elbow to her right hand.  She denies any numbness or paresthesias, reports moderate severe pain rated at 9/10, worse with movement and palpation.  She has swelling of her right hand and wrist.  No neurovascular deficits, no deformity or open fracture.  She denies any LOC, head injury, neck injury or back injury.  There were no falls.    Review of patient's allergies indicates:   Allergen Reactions    Penicillins      Past Medical History:   Diagnosis Date    Asthma     Depression     Kidney calculus     Migraine headache      Past Surgical History:   Procedure Laterality Date    APPENDECTOMY       No family history on file.  Social History[1]  Review of Systems  All other systems reviewed and were negative; see HPI also for additional ROS.'  Physical Exam     Initial Vitals [07/16/25 2027]   BP Pulse Resp Temp SpO2   118/69 81 (!) 80 98.1 °F (36.7 °C) 96 %      MAP       --         Physical Exam    Nursing note and vitals reviewed.      Gen/Constitutional: Interactive.  Moderate emotional distress secondary to pain.  Head: Normocephalic, Atraumatic  Neck: supple, no masses or LAD, no JVD  Eyes: PERRLA, conjunctiva clear  Ears, Nose and Throat: No rhinorrhea or stridor.  Cardiac:  Regular rate, Reg Rhythm, No murmur  Pulmonary: CTA Bilat,  no wheezes, rhonchi, rales.  No increased work of breathing.  GI: Abdomen soft, non-tender, non-distended; no rebound or guarding  : No CVA tenderness.  Musculoskeletal: Extremities warm, well perfused, no erythema, no edema  Right upper extremity:  There is swelling about the right hand extending to the right wrist and forearm.  2+ distal pulses, sensory intact to light touch, no medial or lateral joint line tenderness along the humerus or shoulder.  Skin: No rashes, cyanosis or jaundice.  Neuro: Alert and Oriented x 3; No focal motor or sensory deficits.    Psych: Normal affect      ED Course   Procedures  Labs Reviewed   POCT URINE PREGNANCY       Result Value    POC Preg Test, Ur Negative       Acceptable Yes            Imaging Results              X-Ray Forearm Right (Final result)  Result time 07/16/25 23:00:27      Final result by Navarro Narayan MD (07/16/25 23:00:27)                   Impression:      No acute displaced fracture.      Electronically signed by: Navarro Narayan MD  Date:    07/16/2025  Time:    23:00               Narrative:    EXAMINATION:  XR FOREARM RIGHT; XR ELBOW COMPLETE 3 VIEW RIGHT    CLINICAL HISTORY:  Unspecified injury of right shoulder and upper arm, initial encounter    TECHNIQUE:  Frontal and lateral views of the right forearm.  Three views of the right elbow.    COMPARISON:  None    FINDINGS:  Right forearm: No acute displaced fracture.  No dislocation.  Mild soft tissue edema.  No unexpected radiopaque foreign body.    Right elbow: No acute displaced fracture.  No dislocation.  No sizeable joint effusion.  No unexpected radiopaque foreign body.                                       X-Ray Elbow Complete Right (Final result)  Result time 07/16/25 23:00:27      Final result by Navarro Narayan MD (07/16/25 23:00:27)                   Impression:      No acute displaced fracture.      Electronically signed by: Navarro Narayan  MD  Date:    07/16/2025  Time:    23:00               Narrative:    EXAMINATION:  XR FOREARM RIGHT; XR ELBOW COMPLETE 3 VIEW RIGHT    CLINICAL HISTORY:  Unspecified injury of right shoulder and upper arm, initial encounter    TECHNIQUE:  Frontal and lateral views of the right forearm.  Three views of the right elbow.    COMPARISON:  None    FINDINGS:  Right forearm: No acute displaced fracture.  No dislocation.  Mild soft tissue edema.  No unexpected radiopaque foreign body.    Right elbow: No acute displaced fracture.  No dislocation.  No sizeable joint effusion.  No unexpected radiopaque foreign body.                                       X-Ray Wrist Complete Right (Final result)  Result time 07/16/25 22:59:13      Final result by Navarro Narayan MD (07/16/25 22:59:13)                   Impression:      Diffuse soft tissue edema in the hand and wrist.  No acute displaced fracture.  Consider follow-up radiographs (including dedicated scaphoid views) in 7-10 days if there is strong clinical concern for occult scaphoid fracture.      Electronically signed by: Navarro Narayan MD  Date:    07/16/2025  Time:    22:59               Narrative:    EXAMINATION:  XR HAND COMPLETE 3 VIEW RIGHT; XR WRIST COMPLETE 3 VIEWS RIGHT    CLINICAL HISTORY:  right hand injury; Crushing injury of right hand, initial encounter    TECHNIQUE:  Three views of the right hand and three views of the right wrist.    COMPARISON:  None    FINDINGS:  Right hand: No acute displaced fracture.  No dislocation.  Incidental nutrient foramen noted involving the distal 4th phalanx shaft.  Diffuse soft tissue edema about the hand.  No unexpected radiopaque foreign body.    Right wrist: No acute displaced fracture.  No dislocation.  Diffuse soft tissue edema.  No unexpected radiopaque foreign body.                                       X-Ray Hand 3 View Right (Final result)  Result time 07/16/25 22:59:13      Final result by Navarro Narayan MD  (07/16/25 22:59:13)                   Impression:      Diffuse soft tissue edema in the hand and wrist.  No acute displaced fracture.  Consider follow-up radiographs (including dedicated scaphoid views) in 7-10 days if there is strong clinical concern for occult scaphoid fracture.      Electronically signed by: Navarro Narayan MD  Date:    07/16/2025  Time:    22:59               Narrative:    EXAMINATION:  XR HAND COMPLETE 3 VIEW RIGHT; XR WRIST COMPLETE 3 VIEWS RIGHT    CLINICAL HISTORY:  right hand injury; Crushing injury of right hand, initial encounter    TECHNIQUE:  Three views of the right hand and three views of the right wrist.    COMPARISON:  None    FINDINGS:  Right hand: No acute displaced fracture.  No dislocation.  Incidental nutrient foramen noted involving the distal 4th phalanx shaft.  Diffuse soft tissue edema about the hand.  No unexpected radiopaque foreign body.    Right wrist: No acute displaced fracture.  No dislocation.  Diffuse soft tissue edema.  No unexpected radiopaque foreign body.                             Medications   ketorolac tablet 10 mg (10 mg Oral Given 7/16/25 2122)     Medical Decision Making  Zina Bennett is a 29-year-old female with a history of asthma, depression, kidney stones and migraines presenting with right hand crush injury.    Differential diagnosis includes but not limited to:  Crush injury, fracture, dislocation, contusion, sprain, strain    Amount and/or Complexity of Data Reviewed  Labs: ordered.     Details: UPT - negative  Radiology: ordered and independent interpretation performed.     Details: No acute fracture or dislocation of the hand, wrist, forearm or abdomen    Risk  Prescription drug management.    Afebrile vital signs stable.  Physical exam findings remarkable for swelling overlying the right hand but no anatomic snuffbox tenderness.  There is no bony deformity or open fracture.  She does have swelling likely contusion or crush injury.  She  is neurovascular intact but complaining of pain.  Patient provided ice pack and NSAID for pain relief.  X-rays obtained given severity of pain and injury.  Initial review of the x-ray of the right hand showed 4th proximal phalanx fracture versus nutrient vessel.  After further review and discussion Radiology this was determined to be a nutrient vessel.  Patient placed in buddy tape splint for comfort instructed on early movement and mobilization and NSAID treatment.  Rest, ice, compression elevation also instructed.  Patient verbalized understanding, outpatient follow-up as needed. Patient agreeable to discharge plan. Strict ED precautions and return instructions discussed at length and patient verbalized understanding. All questions were answered and ample time was given for questions.                                          Clinical Impression:  Final diagnoses:  [S67.21XA] Crushing injury of right hand (Primary)  [S49.91XA] Arm injury, right, initial encounter  [S60.221A] Contusion of right hand, initial encounter          ED Disposition Condition    Discharge Stable          ED Prescriptions       Medication Sig Dispense Start Date End Date Auth. Provider    ibuprofen (ADVIL,MOTRIN) 600 MG tablet Take 1 tablet (600 mg total) by mouth every 6 (six) hours as needed for Pain. 20 tablet 7/16/2025 -- Cali Earl DO     Follow-up Information    None         Cali Earl DO, YAW, FACEP  Senior Emergency Staff Physician   Dept of Emergency Medicine   [facility]          Disclaimer: This note has been generated using voice-recognition software. There may be typographical errors that have been missed during proof-reading.         [1]   Social History  Tobacco Use    Smoking status: Never    Smokeless tobacco: Never   Substance Use Topics    Alcohol use: No    Drug use: No        Cali Earl DO  07/17/25 1543

## 2025-07-17 NOTE — DISCHARGE INSTRUCTIONS
Today, your evaluation shows no fracture of your fingers or bones.  You do have bruising of your hand and swelling.  You may use ice for 24 hours, 20 minutes at a time.  You may keep your hand elevated.  Your body finger should stay in place for 3-5 days.  Then please began moving it.    Our goal in the emergency department is to always give you outstanding care and exceptional service. You may receive a survey by mail or e-mail in the next week regarding your experience in our ED. We would greatly appreciate your completing and returning the survey. Your feedback provides us with a way to recognize our staff who give very good care and it helps us learn how to improve when your experience was below our aspiration of excellence.